# Patient Record
Sex: MALE | Race: WHITE
[De-identification: names, ages, dates, MRNs, and addresses within clinical notes are randomized per-mention and may not be internally consistent; named-entity substitution may affect disease eponyms.]

---

## 2019-07-27 ENCOUNTER — HOSPITAL ENCOUNTER (EMERGENCY)
Dept: HOSPITAL 11 - JP.ED | Age: 69
Discharge: HOME | End: 2019-07-27
Payer: MEDICARE

## 2019-07-27 DIAGNOSIS — Z79.899: ICD-10-CM

## 2019-07-27 DIAGNOSIS — Z90.49: ICD-10-CM

## 2019-07-27 DIAGNOSIS — I10: ICD-10-CM

## 2019-07-27 DIAGNOSIS — Z79.4: ICD-10-CM

## 2019-07-27 DIAGNOSIS — E11.9: ICD-10-CM

## 2019-07-27 DIAGNOSIS — L03.116: ICD-10-CM

## 2019-07-27 DIAGNOSIS — T63.441A: Primary | ICD-10-CM

## 2019-07-27 NOTE — EDM.PDOC
ED HPI GENERAL MEDICAL PROBLEM





- General


Chief Complaint: Bite:Animal, Insect


Stated Complaint: LEFT LEG STUNG BY YELLOW JACKETS


Time Seen by Provider: 07/27/19 12:30


Source of Information: Reports: Patient


History Limitations: Reports: No Limitations





- History of Present Illness


INITIAL COMMENTS - FREE TEXT/NARRATIVE: 


Fernando is a 68 year old male, presents to the ED today with redness to left leg 

since being stung by several yellow jackets yesterday, concerned because when 

he was stung 7 years ago he developed a cellulitis.  No significant warmth, no 

fever, chills or systemic symptoms.  Patient is diabetic, blood sugars 

controlled. 


Onset: Gradual


Duration: Day(s): (2)





- Related Data


 Allergies











Allergy/AdvReac Type Severity Reaction Status Date / Time


 


No Known Allergies Allergy   Verified 07/27/19 12:15











Home Meds: 


 Home Meds





Dulaglutide [Trulicity] 1.5 mg SQ ASDIRECTED 07/27/19 [History]


Insulin Degludec [Tresiba] 120 unit SQ DAILY 07/27/19 [History]


Irbesartan [Avapro] 300 mg PO DAILY 07/27/19 [History]


Lansoprazole [Prevacid] 30 mg PO DAILY 07/27/19 [History]


Rosuvastatin Calcium 10 mg PO DAILY 07/27/19 [History]


metFORMIN [Glucophage] 100 mg PO BIDMEALS 07/27/19 [History]











Past Medical History


Cardiovascular History: Reports: High Cholesterol, Hypertension


Respiratory History: Reports: Other (See Below)


Other Respiratory History: seasonal allergies


Gastrointestinal History: Reports: GERD


Endocrine/Metabolic History: Reports: Diabetes, Type II


Oncologic (Cancer) History: Reports: Prostate





- Past Surgical History


GI Surgical History: Reports: Cholecystectomy


Male  Surgical History: Reports: Prostatectomy


Musculoskeletal Surgical History: Reports: Arthroscopic Knee





Social & Family History





- Tobacco Use


Smoking Status *Q: Never Smoker





- Recreational Drug Use


Recreational Drug Use: No





ED ROS GENERAL





- Review of Systems


Review Of Systems: ROS reveals no pertinent complaints other than HPI.





ED EXAM, ANIMAL BITE





- Physical Exam


Exam: See Below


Exam Limited By: No Limitations


General Appearance: Alert, WD/WN, No Apparent Distress


Throat/Mouth: Normal Inspection


Head: Atraumatic


Neck: Normal Inspection, Supple, Non-Tender


Respiratory/Chest: No Respiratory Distress


Cardiovascular: Normal Peripheral Pulses, Regular Rate, Rhythm


Back Exam: Normal Inspection, Full Range of Motion


Extremities: Normal Inspection, Normal Range of Motion


Neurological: Alert, Oriented, CN II-XII Intact


Psychiatric: Normal Affect, Normal Mood


Skin Exam: Other (scattered bites with surrounding erythema to left leg, no 

significant warmth, no abscess formation. )


Lymphatic: No Adenopathy





Course





- Vital Signs


Last Recorded V/S: 


 Last Vital Signs











Temp  36.2 C   07/27/19 12:15


 


Pulse  95   07/27/19 12:15


 


Resp  13   07/27/19 12:15


 


BP  136/94 H  07/27/19 12:15


 


Pulse Ox  94 L  07/27/19 12:15








Fernando is a 68 year old diabetic male presents to the ED with left leg redness 

after being stung by multiple yellow jackets yesterday.  Please refer to HPI 

and focused exam.  Patient's exam consistent with local sting reaction, given 

his hx  in light of his diabetic hx I am going to error on the side of caution 

and start him on Keflex for early cellulitis/prophylaxis.  Patient agreeable, 

reasons to return discussed.  Patient discharged in stable condition. 





Departure





- Departure


Time of Disposition: 13:00


Disposition: Home, Self-Care 01


Clinical Impression: 


Cellulitis


Qualifiers:


 Site of cellulitis: extremity Site of cellulitis of extremity: lower extremity 

Laterality: left Qualified Code(s): L03.116 - Cellulitis of left lower limb





Bee sting reaction


Qualifiers:


 Encounter type: initial encounter Injury intent: accidental or unintentional 

Qualified Code(s): T63.441A - Toxic effect of venom of bees, accidental (

unintentional), initial encounter








- Discharge Information


Instructions:  Cellulitis, Adult, Easy-to-Read, Insect Bite, Adult, Easy-to-Read


Referrals: 


PCP,None [Primary Care Provider] - 


Forms:  ED Department Discharge


Additional Instructions: 


Start Keflex today, take as directed. Benadryl and topical hydrocortisone cream 

as needed.  Return with worsening symptoms.

## 2022-08-07 ENCOUNTER — HOSPITAL ENCOUNTER (EMERGENCY)
Dept: HOSPITAL 11 - JP.ED | Age: 72
Discharge: HOME | End: 2022-08-07
Payer: MEDICARE

## 2022-08-07 DIAGNOSIS — L03.211: ICD-10-CM

## 2022-08-07 DIAGNOSIS — T63.441A: Primary | ICD-10-CM

## 2022-08-07 DIAGNOSIS — Z79.899: ICD-10-CM

## 2022-08-07 DIAGNOSIS — K21.9: ICD-10-CM

## 2022-08-07 DIAGNOSIS — E11.9: ICD-10-CM

## 2022-08-07 DIAGNOSIS — E78.00: ICD-10-CM

## 2022-08-07 DIAGNOSIS — Z79.4: ICD-10-CM

## 2022-08-07 DIAGNOSIS — H60.12: ICD-10-CM

## 2022-08-07 DIAGNOSIS — I10: ICD-10-CM

## 2022-08-07 PROCEDURE — 96372 THER/PROPH/DIAG INJ SC/IM: CPT

## 2022-08-07 PROCEDURE — 99282 EMERGENCY DEPT VISIT SF MDM: CPT
